# Patient Record
Sex: MALE | Race: WHITE | Employment: FULL TIME | URBAN - METROPOLITAN AREA
[De-identification: names, ages, dates, MRNs, and addresses within clinical notes are randomized per-mention and may not be internally consistent; named-entity substitution may affect disease eponyms.]

---

## 2024-11-22 ENCOUNTER — HOSPITAL ENCOUNTER (OUTPATIENT)
Facility: HOSPITAL | Age: 24
Setting detail: HOSPITAL OUTPATIENT SURGERY
Discharge: HOME | End: 2024-11-22
Attending: SPECIALIST | Admitting: SPECIALIST
Payer: COMMERCIAL

## 2024-11-22 ENCOUNTER — ANESTHESIA EVENT (OUTPATIENT)
Dept: SURGERY | Facility: HOSPITAL | Age: 24
End: 2024-11-22
Payer: COMMERCIAL

## 2024-11-22 ENCOUNTER — ANESTHESIA (OUTPATIENT)
Dept: SURGERY | Facility: HOSPITAL | Age: 24
End: 2024-11-22
Payer: COMMERCIAL

## 2024-11-22 VITALS
HEART RATE: 80 BPM | RESPIRATION RATE: 14 BRPM | WEIGHT: 258 LBS | BODY MASS INDEX: 34.95 KG/M2 | TEMPERATURE: 99 F | OXYGEN SATURATION: 96 % | SYSTOLIC BLOOD PRESSURE: 140 MMHG | DIASTOLIC BLOOD PRESSURE: 75 MMHG | HEIGHT: 72 IN

## 2024-11-22 DIAGNOSIS — L05.01 PILONIDAL CYST WITH ABSCESS: Primary | ICD-10-CM

## 2024-11-22 PROCEDURE — 87075 CULTR BACTERIA EXCEPT BLOOD: CPT | Performed by: SPECIALIST

## 2024-11-22 PROCEDURE — 87205 SMEAR GRAM STAIN: CPT | Performed by: SPECIALIST

## 2024-11-22 PROCEDURE — 87070 CULTURE OTHR SPECIMN AEROBIC: CPT | Performed by: SPECIALIST

## 2024-11-22 PROCEDURE — 87186 SC STD MICRODIL/AGAR DIL: CPT | Performed by: SPECIALIST

## 2024-11-22 PROCEDURE — 87176 TISSUE HOMOGENIZATION CULTR: CPT | Performed by: SPECIALIST

## 2024-11-22 PROCEDURE — 87147 CULTURE TYPE IMMUNOLOGIC: CPT | Performed by: SPECIALIST

## 2024-11-22 PROCEDURE — 0JB70ZZ EXCISION OF BACK SUBCUTANEOUS TISSUE AND FASCIA, OPEN APPROACH: ICD-10-PCS | Performed by: SPECIALIST

## 2024-11-22 PROCEDURE — 87076 CULTURE ANAEROBE IDENT EACH: CPT | Performed by: SPECIALIST

## 2024-11-22 PROCEDURE — 88304 TISSUE EXAM BY PATHOLOGIST: CPT | Performed by: SPECIALIST

## 2024-11-22 RX ORDER — HYDROCODONE BITARTRATE AND ACETAMINOPHEN 5; 325 MG/1; MG/1
1-2 TABLET ORAL EVERY 4 HOURS PRN
Qty: 20 TABLET | Refills: 0 | Status: SHIPPED | OUTPATIENT
Start: 2024-11-22

## 2024-11-22 RX ORDER — ONDANSETRON 2 MG/ML
4 INJECTION INTRAMUSCULAR; INTRAVENOUS EVERY 6 HOURS PRN
Status: DISCONTINUED | OUTPATIENT
Start: 2024-11-22 | End: 2024-11-22

## 2024-11-22 RX ORDER — MIDAZOLAM HYDROCHLORIDE 1 MG/ML
INJECTION INTRAMUSCULAR; INTRAVENOUS AS NEEDED
Status: DISCONTINUED | OUTPATIENT
Start: 2024-11-22 | End: 2024-11-22 | Stop reason: SURG

## 2024-11-22 RX ORDER — FAMOTIDINE 10 MG/ML
20 INJECTION, SOLUTION INTRAVENOUS ONCE
Status: COMPLETED | OUTPATIENT
Start: 2024-11-22 | End: 2024-11-22

## 2024-11-22 RX ORDER — MORPHINE SULFATE 4 MG/ML
4 INJECTION, SOLUTION INTRAMUSCULAR; INTRAVENOUS EVERY 10 MIN PRN
Status: DISCONTINUED | OUTPATIENT
Start: 2024-11-22 | End: 2024-11-22

## 2024-11-22 RX ORDER — HYDROMORPHONE HYDROCHLORIDE 1 MG/ML
0.4 INJECTION, SOLUTION INTRAMUSCULAR; INTRAVENOUS; SUBCUTANEOUS EVERY 5 MIN PRN
Status: DISCONTINUED | OUTPATIENT
Start: 2024-11-22 | End: 2024-11-22

## 2024-11-22 RX ORDER — BUPIVACAINE HYDROCHLORIDE 5 MG/ML
INJECTION, SOLUTION EPIDURAL; INTRACAUDAL AS NEEDED
Status: DISCONTINUED | OUTPATIENT
Start: 2024-11-22 | End: 2024-11-22 | Stop reason: HOSPADM

## 2024-11-22 RX ORDER — HYDROMORPHONE HYDROCHLORIDE 1 MG/ML
INJECTION, SOLUTION INTRAMUSCULAR; INTRAVENOUS; SUBCUTANEOUS AS NEEDED
Status: DISCONTINUED | OUTPATIENT
Start: 2024-11-22 | End: 2024-11-22 | Stop reason: SURG

## 2024-11-22 RX ORDER — NALOXONE HYDROCHLORIDE 0.4 MG/ML
80 INJECTION, SOLUTION INTRAMUSCULAR; INTRAVENOUS; SUBCUTANEOUS AS NEEDED
Status: DISCONTINUED | OUTPATIENT
Start: 2024-11-22 | End: 2024-11-22

## 2024-11-22 RX ORDER — ACETAMINOPHEN 500 MG
1000 TABLET ORAL ONCE
Status: COMPLETED | OUTPATIENT
Start: 2024-11-22 | End: 2024-11-22

## 2024-11-22 RX ORDER — FAMOTIDINE 20 MG/1
20 TABLET, FILM COATED ORAL ONCE
Status: COMPLETED | OUTPATIENT
Start: 2024-11-22 | End: 2024-11-22

## 2024-11-22 RX ORDER — MORPHINE SULFATE 4 MG/ML
2 INJECTION, SOLUTION INTRAMUSCULAR; INTRAVENOUS EVERY 10 MIN PRN
Status: DISCONTINUED | OUTPATIENT
Start: 2024-11-22 | End: 2024-11-22

## 2024-11-22 RX ORDER — DEXAMETHASONE SODIUM PHOSPHATE 4 MG/ML
VIAL (ML) INJECTION AS NEEDED
Status: DISCONTINUED | OUTPATIENT
Start: 2024-11-22 | End: 2024-11-22 | Stop reason: SURG

## 2024-11-22 RX ORDER — ONDANSETRON 2 MG/ML
INJECTION INTRAMUSCULAR; INTRAVENOUS AS NEEDED
Status: DISCONTINUED | OUTPATIENT
Start: 2024-11-22 | End: 2024-11-22 | Stop reason: SURG

## 2024-11-22 RX ORDER — METOCLOPRAMIDE 10 MG/1
10 TABLET ORAL ONCE
Status: COMPLETED | OUTPATIENT
Start: 2024-11-22 | End: 2024-11-22

## 2024-11-22 RX ORDER — SODIUM CHLORIDE, SODIUM LACTATE, POTASSIUM CHLORIDE, CALCIUM CHLORIDE 600; 310; 30; 20 MG/100ML; MG/100ML; MG/100ML; MG/100ML
INJECTION, SOLUTION INTRAVENOUS CONTINUOUS
Status: DISCONTINUED | OUTPATIENT
Start: 2024-11-22 | End: 2024-11-22

## 2024-11-22 RX ORDER — ROCURONIUM BROMIDE 10 MG/ML
INJECTION, SOLUTION INTRAVENOUS AS NEEDED
Status: DISCONTINUED | OUTPATIENT
Start: 2024-11-22 | End: 2024-11-22 | Stop reason: SURG

## 2024-11-22 RX ORDER — LIDOCAINE HYDROCHLORIDE 10 MG/ML
INJECTION, SOLUTION EPIDURAL; INFILTRATION; INTRACAUDAL; PERINEURAL AS NEEDED
Status: DISCONTINUED | OUTPATIENT
Start: 2024-11-22 | End: 2024-11-22 | Stop reason: SURG

## 2024-11-22 RX ORDER — METOCLOPRAMIDE HYDROCHLORIDE 5 MG/ML
10 INJECTION INTRAMUSCULAR; INTRAVENOUS ONCE
Status: COMPLETED | OUTPATIENT
Start: 2024-11-22 | End: 2024-11-22

## 2024-11-22 RX ORDER — HYDROMORPHONE HYDROCHLORIDE 1 MG/ML
0.2 INJECTION, SOLUTION INTRAMUSCULAR; INTRAVENOUS; SUBCUTANEOUS EVERY 5 MIN PRN
Status: DISCONTINUED | OUTPATIENT
Start: 2024-11-22 | End: 2024-11-22

## 2024-11-22 RX ADMIN — SODIUM CHLORIDE, SODIUM LACTATE, POTASSIUM CHLORIDE, CALCIUM CHLORIDE: 600; 310; 30; 20 INJECTION, SOLUTION INTRAVENOUS at 11:55:00

## 2024-11-22 RX ADMIN — ROCURONIUM BROMIDE 50 MG: 10 INJECTION, SOLUTION INTRAVENOUS at 10:44:00

## 2024-11-22 RX ADMIN — MIDAZOLAM HYDROCHLORIDE 2 MG: 1 INJECTION INTRAMUSCULAR; INTRAVENOUS at 10:40:00

## 2024-11-22 RX ADMIN — SODIUM CHLORIDE, SODIUM LACTATE, POTASSIUM CHLORIDE, CALCIUM CHLORIDE: 600; 310; 30; 20 INJECTION, SOLUTION INTRAVENOUS at 10:40:00

## 2024-11-22 RX ADMIN — DEXAMETHASONE SODIUM PHOSPHATE 8 MG: 4 MG/ML VIAL (ML) INJECTION at 11:02:00

## 2024-11-22 RX ADMIN — LIDOCAINE HYDROCHLORIDE 50 MG: 10 INJECTION, SOLUTION EPIDURAL; INFILTRATION; INTRACAUDAL; PERINEURAL at 10:43:00

## 2024-11-22 RX ADMIN — ONDANSETRON 4 MG: 2 INJECTION INTRAMUSCULAR; INTRAVENOUS at 11:24:00

## 2024-11-22 RX ADMIN — HYDROMORPHONE HYDROCHLORIDE 0.5 MG: 1 INJECTION, SOLUTION INTRAMUSCULAR; INTRAVENOUS; SUBCUTANEOUS at 11:04:00

## 2024-11-22 NOTE — BRIEF OP NOTE
Pre-Operative Diagnosis: pilonidal cyst and abscess     Post-Operative Diagnosis: same     Procedure Performed:   Excision of pilonidal cyst and pilonidal abscess    Surgeons and Role:     * Mark Gilman MD - Primary    Assistant(s):        Surgical Findings: pilonidal cyst and abscess     Specimen: same     Estimated Blood Loss: Blood Output: 5 mL (11/22/2024 11:37 AM)      Dictation Number:  [id number]    MARK GILMAN MD  11/22/2024  12:00 PM

## 2024-11-22 NOTE — ANESTHESIA PREPROCEDURE EVALUATION
Anesthesia PreOp Note    HPI:     Mark Santana is a 24 year old male who presents for preoperative consultation requested by: Mark Gilman MD    Date of Surgery: 11/22/2024    Procedure(s):  Excision of pilonidal cyst and pilonidal abscess  Indication: Skin cyst, skin abscess    Relevant Problems   No relevant active problems       NPO:  Last Liquid Consumption Date: 11/21/24  Last Liquid Consumption Time: 2200  Last Solid Consumption Date: 11/21/24  Last Solid Consumption Time: 2200  Last Liquid Consumption Date: 11/21/24          History Review:  There are no active problems to display for this patient.      Past Medical History:    Attention deficit hyperactivity disorder (ADHD)    as a kid, no meds prescribed    Cellulitis    rt foot    Visual impairment    contacts       Past Surgical History:   Procedure Laterality Date    Adenoidectomy      2007 or 2008    Incision and drainage Right 06/2024    foot       Prescriptions Prior to Admission[1]  Current Medications and Prescriptions Ordered in Epic[2]    Allergies[3]    Family History   Problem Relation Age of Onset    No Known Problems Father     Diabetes Mother     Cancer Paternal Grandmother         breast    No Known Problems Paternal Grandfather      Social History     Socioeconomic History    Marital status: Single   Tobacco Use    Smoking status: Never    Smokeless tobacco: Never   Vaping Use    Vaping status: Never Used   Substance and Sexual Activity    Alcohol use: Not Currently     Comment: socially    Drug use: Not Currently     Types: Cannabis     Comment: smokes marijuana in 2021       Available pre-op labs reviewed.             Vital Signs:  Body mass index is 34.99 kg/m².   height is 1.829 m (6') and weight is 117 kg (258 lb). His oral temperature is 98.3 °F (36.8 °C). His blood pressure is 142/64 and his pulse is 68. His respiration is 18 and oxygen saturation is 94%.   Vitals:    11/20/24 1353 11/22/24 0835   BP:  142/64   Pulse:  68    Resp:  18   Temp:  98.3 °F (36.8 °C)   TempSrc:  Oral   SpO2:  94%   Weight: 117.5 kg (259 lb) 117 kg (258 lb)   Height: 1.829 m (6') 1.829 m (6')        Anesthesia Evaluation     Patient summary reviewed and Nursing notes reviewed    No history of anesthetic complications   Airway   Mallampati: I  TM distance: >3 FB  Neck ROM: full  Dental - Dentition appears grossly intact     Pulmonary - negative ROS and normal exam     ROS comment: Denies smoking or marijuana use   Cardiovascular - normal exam  Exercise tolerance: good    Neuro/Psych - negative ROS   (+)   attention deficit hyperactivity disorder      GI/Hepatic/Renal - negative ROS     Endo/Other - negative ROS   Abdominal   (+) obese                 Anesthesia Plan:   ASA:  2  Plan:   General  Airway:  ETT  Post-op Pain Management: IV analgesics  Informed Consent Plan and Risks Discussed With:  Patient  Discussed plan with:  Surgeon      I have informed Mark Santana and/or legal guardian or family member of the nature of the anesthetic plan, benefits, risks including possible dental damage if relevant, major complications, and any alternative forms of anesthetic management.   All of the patient's questions were answered to the best of my ability. The patient desires the anesthetic management as planned.  Ekaterina Valera MD  11/22/2024 10:22 AM  Present on Admission:  **None**         [1]   No medications prior to admission.   [2]   Current Facility-Administered Medications Ordered in Epic   Medication Dose Route Frequency Provider Last Rate Last Admin    lactated ringers infusion   Intravenous Continuous Mark Gilman MD 20 mL/hr at 11/22/24 0836 New Bag at 11/22/24 0836    ceFAZolin (Ancef) 2g in 10mL IV syringe premix  2 g Intravenous Once Mark Gilman MD         No current [facility]-ordered outpatient medications on file.   [3]   Allergies  Allergen Reactions    Tamiflu [Oseltamivir] RASH     As a kid

## 2024-11-22 NOTE — ANESTHESIA PROCEDURE NOTES
Airway  Date/Time: 11/22/2024 10:47 AM  Urgency: Elective    Airway not difficult    General Information and Staff    Patient location during procedure: OR  Anesthesiologist: Ekaterina Valera MD  Performed: anesthesiologist   Performed by: Ekaterina Valera MD  Authorized by: Ekaterina Valera MD      Indications and Patient Condition  Indications for airway management: anesthesia  Spontaneous Ventilation: absent  Sedation level: deep  Preoxygenated: yes  Patient position: sniffing  Mask difficulty assessment: 1 - vent by mask  Planned trial extubation    Final Airway Details  Final airway type: endotracheal airway      Successful airway: ETT  Cuffed: yes   Successful intubation technique: direct laryngoscopy  Facilitating devices/methods: intubating stylet and cricoid pressure  Endotracheal tube insertion site: oral  Blade: Elmer  Blade size: #4  ETT size (mm): 7.5    Cormack-Lehane Classification: grade IIA - partial view of glottis  Placement verified by: capnometry   Measured from: lips  ETT to lips (cm): 23  Number of attempts at approach: 1  Number of other approaches attempted: 0    Additional Comments  Intubated easily on first attempt, no dental or soft tissue damage. No signs of aspiration.   Soft bite block placed between teeth

## 2024-11-22 NOTE — H&P
[facility]  part of [facility]    History & Physical    Mark Santana Patient Status:  Hospital Outpatient Surgery    2000 MRN [id number]   Location [facility] PRE OP RECOVERY Attending Mark Gilman MD   Hosp Day # 0 PCP CORIE LANIER     Date of Admission:  2024    History of Present Illness:  Mark Santana is a(n) 24 year old male. Pilonidal cyst and abscess    History:  Past Medical History:    Attention deficit hyperactivity disorder (ADHD)    as a kid, no meds prescribed    Cellulitis    rt foot    Visual impairment    contacts     Past Surgical History:   Procedure Laterality Date    Adenoidectomy       or 2008    Incision and drainage Right 2024    foot     Family History   Problem Relation Age of Onset    No Known Problems Father     Diabetes Mother     Cancer Paternal Grandmother         breast    No Known Problems Paternal Grandfather       reports that he has never smoked. He has never used smokeless tobacco. He reports that he does not currently use alcohol. He reports that he does not currently use drugs after having used the following drugs: Cannabis.    Allergies:  Allergies[1]    Home Medications:  Prescriptions Prior to Admission[2]    Physical Exam:   General: Alert, orientated x3.  Cooperative.  No apparent distress.  Vital Signs:  Blood pressure 142/64, pulse 68, temperature 98.3 °F (36.8 °C), temperature source Oral, resp. rate 18, height 6' (1.829 m), weight 258 lb (117 kg), SpO2 94%.  HEENT: Exam is unremarkable.    Lungs: per attending  Cardiac: per attending  Abdomen: grossly neg    back- pilonidal cyst and abscess     Skin: chronic abscesses  Neurologic: per attending  Laboratory Data:  No results found for: \"WBC\", \"HGB\", \"HCT\", \"PLT\", \"CREATSERUM\", \"BUN\", \"NA\", \"K\", \"CL\", \"CO2\", \"GLU\", \"CA\", \"ALB\", \"ALKPHO\", \"BILT\", \"TP\", \"AST\", \"ALT\", \"PTT\", \"INR\", \"PT\", \"T4F\", \"TSH\", \"TSHREFLEX\", \"PENNY\", \"LIP\", \"GGT\", \"PSA\", \"DDIMER\",  \"ESRML\", \"ESRPF\", \"CRP\", \"BNP\", \"MG\", \"PHOS\", \"TROP\", \"CK\", \"CKMB\", \"QING\", \"RPR\", \"B12\", \"ETOH\", \"POCGLU\"    Imaging:    No results found.    Impression and Plan:  Pilonidal cyst and abscess    USMIT MCELROY MD  11/22/2024  9:14 AM       [1]   Allergies  Allergen Reactions    Tamiflu [Oseltamivir] RASH     As a kid   [2]   No medications prior to admission.

## 2024-11-22 NOTE — ANESTHESIA POSTPROCEDURE EVALUATION
Patient: Mark Santana    Procedure Summary       Date: 11/22/24 Room / Location: [facility] MAIN OR [address] [place] [address] MAIN OR    Anesthesia Start: 1039 Anesthesia Stop: 1156    Procedure: Excision of pilonidal cyst and pilonidal abscess (Perineum) Diagnosis: (Skin cyst, skin abscess)    Surgeons: Mark Gilman MD Anesthesiologist: Ekaterina Valera MD    Anesthesia Type: general ASA Status: 2            Anesthesia Type: general    Vitals Value Taken Time   /81 11/22/24 1156   Temp 98.7 °F (37.1 °C) 11/22/24 1156   Pulse 89 11/22/24 1156   Resp 23 11/22/24 1156   SpO2 97 % 11/22/24 1156   Vitals shown include unfiled device data.    [facility] AN Post Evaluation:   Patient Evaluated in PACU  Patient Participation: complete - patient participated  Level of Consciousness: awake  Pain Management: adequate  Airway Patency:patent  Dental exam unchanged from preop  Yes    Nausea/Vomiting: none  Cardiovascular Status: acceptable and hemodynamically stable  Respiratory Status: acceptable, nonlabored ventilation, spontaneous ventilation and nasal cannula  Postoperative Hydration acceptable      Ekaterina Valera MD  11/22/2024 11:56 AM

## 2024-11-22 NOTE — DISCHARGE INSTRUCTIONS
DISCHARGE INSTRUCTIONS  - KEEP DRESSING CLEAN, DRY AND INTACT  -DO NOT CHANGE DRESSING  - FOLLOW UP WITH DR ON MONDAY  -MAY SHOWER AFTER 24 HRS. NO BATHS, SWIMMING.  - CALL DR IF FEVER OVER 101.0, PAIN NOT CONTROLLED WITH PAIN MEDICATION, DRESSING SATURATED, SWELLING,   Pilonidal Cyst  A pilonidal cyst is found near the base of the spine (tailbone) or top of the buttocks crease. It may look like a pit or small depression. In some cases, it may have a hollow tunnel (sinus tract) that connects it to the surface of the skin. Normally, a pilonidal cyst doesn't cause symptoms. But if it becomes infected, it can cause pain and swelling.     What causes a pilonidal cyst and who gets them?   The cause of pilonidal cysts has been debated since they were first recognized. Most likely, the cause may be a combination of:   Ingrown hairs. This happens when a hair is forced under the skin or when a hair follicle breaks open (ruptures).  Injury to the area.  Injury, rubbing, or skin irritation may cause pilonidal cysts. This can happen from sitting for long periods of time. It can be more common in people who sit or drive a lot for work.  These cysts are often diagnosed in people between ages 16 and 26. But people of any age can have a pilonidal cyst. A person may be born with a cyst but not notice it. These cysts can affect anyone, but they're more common in people who are assigned male at birth.   Symptoms of a pilonidal cyst infection   A pilonidal cyst may not cause symptoms unless it becomes infected or inflamed. Once a pilonidal cyst becomes infected, it's called a pilonidal abscess. Infection or inflammation from irritation may cause these symptoms:   Pain, redness, and swelling of the cyst and area around it  Foul-smelling drainage from the cyst  Fever  Preventing infection  A pilonidal cyst can easily get infected. To help prevent infections:   Keep the cyst and nearby skin area clean.  Remove hair from the area of the  cyst regularly. Ask your healthcare provider about safe hair removal products or procedures.  Don't sit in 1 position for long periods of time. This helps to reduce weight and pressure on your tailbone area. Sitting on a special cushion to ease pressure on the tailbone may also help. Ask your healthcare provider about where to buy these cushions.  Don't wear tight-fitting clothes. Looser clothing can help reduce skin irritation around the cyst.  [employer] last reviewed this educational content on 10/1/2021  © 7517-8007 [employer]. All rights reserved. This information is not intended as a substitute for professional medical care. Always follow your healthcare professional's instructions.

## 2024-12-06 NOTE — OPERATIVE REPORT
[facility]    PATIENT'S NAME: MARK ADHIKARI   ATTENDING PHYSICIAN: Mark Gilman MD   OPERATING PHYSICIAN: Mark Gilman MD   PATIENT ACCOUNT#:   [id number]    LOCATION:  [facility] 10  MEDICAL RECORD #:   [id number]       YOB: 2000  ADMISSION DATE:       11/22/2024      OPERATION DATE:  11/22/2024    OPERATIVE REPORT      PREOPERATIVE DIAGNOSIS:  Pilonidal cyst with abscess.  POSTOPERATIVE DIAGNOSIS:  Pilonidal cyst with abscess.  PROCEDURE:  Excision of pilonidal cyst and pilonidal abscess.    SPECIMENS:  Pilonidal cyst and abscess.  Tissue was sent to Pathology and also culture.    BLOOD LOSS:  5 mL.    DISPOSITION:  Stable.    COMPLICATIONS:  None.    FINDINGS:  Pilonidal cyst and abscess.    OPERATIVE TECHNIQUE:  The patient came to the operating room, underwent general endotracheal anesthesia, was placed in the prone position.  We prepped and draped the lower back.  He had a pilonidal cyst.  At least one track was noted.  There was also a deep track located above the rectum.  He had an abscess which was more superior.  After prepping and draping, we made an ellipse around the whole pilonidal cyst, including the abscess.  Took all of it, dissected all the way down to fascia.  All tracks and abscess tissue were removed.  It was clean tissue.  Any bleeding points electrocoagulated.  Sewed the deep tissue with numerous stitches of chromic.  Left an opening of about 1/4-inch.  This was packed with Vaseline gauze and a pressure dressing.  Did infiltrate it with 0.5% Marcaine without.  Pressure dressing applied.  No complications.  The patient tolerated the procedure well.      Dictated By Mark Gilman MD  d: 12/05/2024 11:05:56  t: 12/06/2024 10:53:56  [facility] [id number]  [facility]/

## (undated) DEVICE — PAD PREP 24X43.5IN W/ PCH

## (undated) DEVICE — SPONGE LAP 18X18IN WHT COT 4 PLY FLD STRUNG

## (undated) DEVICE — 3M™ MICROFOAM™ TAPE 1528-4: Brand: 3M™ MICROFOAM™

## (undated) DEVICE — BANDAGE,GAUZE,BULKEE II,4.5"X4.1YD,STRL: Brand: MEDLINE

## (undated) DEVICE — PETROLATUM GAUZE STRIP: Brand: VASELINE

## (undated) DEVICE — HANDLE SUR BLU PLAS LT FLX SLIP ON ST DISP

## (undated) DEVICE — CONTAINER SPEC COLL AND TRNSPRT W/ WHT CAP

## (undated) DEVICE — WET SKIN PREP TRAY: Brand: MEDLINE INDUSTRIES, INC.

## (undated) DEVICE — SUT CHRM GUT 2-0 27IN MH ABSRB UD 36MM 1/2

## (undated) DEVICE — GAUZE,SPONGE,FLUFF,6"X6.75",STRL,5/TRAY: Brand: MEDLINE

## (undated) DEVICE — GAMMEX® PI HYBRID SIZE 8.5, STERILE POWDER-FREE SURGICAL GLOVE, POLYISOPRENE AND NEOPRENE BLEND: Brand: GAMMEX

## (undated) DEVICE — DRESSING ADAPTIC L16XW3IN OIL ADH

## (undated) DEVICE — SHEET, T, LAPAROTOMY, STERILE: Brand: MEDLINE

## (undated) DEVICE — SUT ETHLN 2-0 18IN FS NABSRB BLK 26MM 3/8 CIR

## (undated) DEVICE — BLADE SUR W37.2MM CUT H0.23MM GEN PURP

## (undated) DEVICE — MINOR GENERAL: Brand: MEDLINE INDUSTRIES, INC.

## (undated) DEVICE — SYRINGE MED 10ML LL CTRL W/ FNGR GRP CLR BRL

## (undated) DEVICE — SUCTION CANISTER, 3000CC,SAFELINER: Brand: DEROYAL

## (undated) DEVICE — ENCORE® LATEX MICRO SIZE 8.5, STERILE LATEX POWDER-FREE SURGICAL GLOVE: Brand: ENCORE

## (undated) DEVICE — GLOVE SUR 8.5 SENSICARE NEOPR PWD F

## (undated) DEVICE — ELECTRODE ES RET 2 PATE W/ 10FT CRD MPLR DISP

## (undated) DEVICE — 3M™ TEGADERM™ TRANSPARENT FILM DRESSING FRAME STYLE, 1629, 8 IN X 12 IN (20 CM X 30 CM), 10/CT 8CT/CASE: Brand: 3M™ TEGADERM™

## (undated) DEVICE — SOLUTION IRRIG 1000ML 0.9% NACL USP BTL

## (undated) DEVICE — PAD,ABDOMINAL,8"X7.5",STERILE,LF,1/PK: Brand: MEDLINE

## (undated) DEVICE — Device: Brand: JELCO

## (undated) DEVICE — SUT CHRM GUT 2-0 27IN SH ABSRB UD 26MM 1/2